# Patient Record
Sex: FEMALE | Race: WHITE | HISPANIC OR LATINO | URBAN - METROPOLITAN AREA
[De-identification: names, ages, dates, MRNs, and addresses within clinical notes are randomized per-mention and may not be internally consistent; named-entity substitution may affect disease eponyms.]

---

## 2021-05-25 ENCOUNTER — EMERGENCY (EMERGENCY)
Facility: HOSPITAL | Age: 71
LOS: 1 days | Discharge: HOME | End: 2021-05-25
Attending: EMERGENCY MEDICINE
Payer: COMMERCIAL

## 2021-05-25 VITALS
WEIGHT: 134.92 LBS | HEART RATE: 73 BPM | SYSTOLIC BLOOD PRESSURE: 168 MMHG | TEMPERATURE: 98 F | RESPIRATION RATE: 18 BRPM | OXYGEN SATURATION: 98 % | DIASTOLIC BLOOD PRESSURE: 70 MMHG

## 2021-05-25 PROCEDURE — 99283 EMERGENCY DEPT VISIT LOW MDM: CPT | Mod: 25

## 2021-05-25 PROCEDURE — 73562 X-RAY EXAM OF KNEE 3: CPT

## 2021-05-25 PROCEDURE — 73562 X-RAY EXAM OF KNEE 3: CPT | Mod: 26,RT

## 2021-05-25 PROCEDURE — 99283 EMERGENCY DEPT VISIT LOW MDM: CPT

## 2021-05-25 RX ORDER — OXYCODONE AND ACETAMINOPHEN 5; 325 MG/1; MG/1
1 TABLET ORAL ONCE
Refills: 0 | Status: DISCONTINUED | OUTPATIENT
Start: 2021-05-25 | End: 2021-05-25

## 2021-05-25 RX ORDER — CAPSAICIN 0.025 %
1 CREAM (GRAM) TOPICAL THREE TIMES A DAY
Refills: 0 | Status: DISCONTINUED | OUTPATIENT
Start: 2021-05-25 | End: 2021-05-28

## 2021-05-25 RX ORDER — CAPSAICIN 0.025 %
1 CREAM (GRAM) TOPICAL
Qty: 1 | Refills: 0
Start: 2021-05-25 | End: 2021-06-08

## 2021-05-25 RX ADMIN — OXYCODONE AND ACETAMINOPHEN 1 TABLET(S): 5; 325 TABLET ORAL at 14:46

## 2021-05-25 RX ADMIN — OXYCODONE AND ACETAMINOPHEN 1 TABLET(S): 5; 325 TABLET ORAL at 14:54

## 2021-05-25 RX ADMIN — Medication 1 APPLICATION(S): at 14:48

## 2021-05-25 NOTE — ED PROVIDER NOTE - CHPI ED SYMPTOMS NEG
no abrasion/no back pain/no deformity/no fever/no numbness/no tingling/no weakness/no bruising/no difficulty bearing weight

## 2021-05-25 NOTE — ED PROVIDER NOTE - PATIENT PORTAL LINK FT
You can access the FollowMyHealth Patient Portal offered by [facility] by registering at the following website: [email or web address]. By joining [employer]’s FollowMyHealth portal, you will also be able to view your health information using other applications (apps) compatible with our system.

## 2023-12-11 NOTE — ED ADULT NURSE NOTE - SUICIDE SCREENING QUESTION 3
Physical Therapy Progress Note    Visit Type: Progress Note- Daily Treatment Note  Visit: 13  Referring Provider: Marlena Mi DO  Medical Diagnosis (from order): M54.2 - Cervicalgia     SUBJECTIVE                                                                                                               The patient reports headaches are improved. Notes occasional headache but not as severe has they had been.   Functional Change: Per above.  Current Functional Limitations: None    Pain / Symptoms  - Pain rating (out of 10): Current: 0     OBJECTIVE                                                                                                                     Range of Motion (ROM)   (degrees unless noted; active unless noted; norms in ( ); negative=lacking to 0, positive=beyond 0)  Cervical:    - Flexion (45-50): WFL    - Extension (45-60): WFL    - Side Bend (45):        • Left: WFL        • Right: WFL    Strength  (out of 5 unless noted, standard test position unless noted)   Shoulder:     - Flexion:         • Left: 5         • Right: 5     - Abduction:        • Left: 5        • Right: 5    - Internal Rotation:          • Left (at 0°): 5        • Right (at 0°): 5    - External Rotation:         • Left (at 0°): 5        • Right (at 0°): 5        Palpation  Left  - Upper Trapezius: trigger point  Right  - Upper Trapezius: trigger point  Joint Play   Thoracic Region Spring   - Middle Thoracic Spring: • Left hypomobile • Right hypomobile            Outcome/Assessments  Outcome Measures:   Neck Disability Index (NDI): Neck Disability Index Score: 4  NDI Total Possible Score: 50  NDI Score Calculated: 8 %  (scored 0-100, higher score indicates higher disability) see flowsheet for additional documentation       Treatment    Dry Needling:  - Consent signed: yes  - Dry needling used to/for: pain relief  - Time Out performed at 1755, with patient verifying procedure and consent prior to performing the  procedure.    Sign Out performed at 1800, with patient verifying procedure performed and addressing specimens if applicable upon completion of the procedure.    Education about indications, contraindications and potential side effects completed with patient.  Screen Completed    - Precautions: local skin lesions, lyme disease, local lymphedema, severe hyperalgesia/allodynia, metal allergies: nickel and chromium, abnormal bleeding tendency, immunodeficiency and/or compromised immune system, second or third trimester of pregnancy, vascular disease, history of spontaneous pneumothorax   - Contraindications: local or systemic infections including the flu, over implants, active cancer, area of lymphatic compromise, area of lumpectomy/mastectomy, first trimester of pregnancy      - Location: R/L upper trapezius Needle size: 50 Quantity: 2   - Location: R/L suboccipital mm Needle size: 40 Quantity: 2    Total Needles Inserted: 4 Removed: 4    Results: decreased pain  Reaction: no adverse reaction to treatment    Therapeutic Exercise  - UBE 5' level 4  - Shoulder ER isotonic: 2x10 R/L    -cues for postural control  - Lateral raises R/L 5# DB 2 x 15  -TRX Row: 2x10 B    -cues for technique, cervical position    Manual Therapy   Prone: STM bilateral upper trapezius mm and cervical paraspinal mm to reduce pain and improve ROM.   PA mobilization mid-thoracic spine to improve ROM and decrease pain      Skilled input: verbal instruction/cues    Writer verbally educated and received verbal consent for hand placement, positioning of patient, and techniques to be performed today from patient for therapist position for techniques as described above and how they are pertinent to the patient's plan of care.  Home Exercise Program  Access Code: [email or web address]  URL: [email or web address]  Date: 11/20/2023  Prepared by: Luna López    Exercises  - Supine Chin Tucks on Flat Ball  - 1 x daily - 7 x weekly - 1 sets - 10  reps - 20 second hold  - Shoulder External Rotation and Scapular Retraction with Resistance  - 1 x daily - 7 x weekly - 3 sets - 10 reps    ASSESSMENT                                                                                                          To date the patient has made gains as expected.  Patient will continue to benefit from skilled care as outlined.  Patient continues to have impairments and functional deficits as noted.    Patient is independent with HEP at this time however apprehensive about self management of pain at prior level of function. Patient will follow up before 01/11/2024 if further therapy is required to reinforce independent management of symptoms or exercise progression. If clinic is not contacted patient will be d/c with HEP and is in agreement with Plan of care and this note will serve as a d/c note.   Education:   - Results of above outlined education: Verbalizes understanding    PLAN                                                                                                                          continue current plan of care    Suggestions for next session as indicated: Progress per plan of care     Therapy procedure time and total treatment time can be found documented on the Time Entry flowsheet     No

## 2024-01-23 NOTE — ED ADULT NURSE NOTE - ISOLATION TYPE:
How Severe Is Your Rash?: mild Is This A New Presentation, Or A Follow-Up?: Rash Additional History: patient presents today to rule out scabies None